# Patient Record
Sex: MALE | Race: WHITE
[De-identification: names, ages, dates, MRNs, and addresses within clinical notes are randomized per-mention and may not be internally consistent; named-entity substitution may affect disease eponyms.]

---

## 2019-09-14 ENCOUNTER — HOSPITAL ENCOUNTER (EMERGENCY)
Dept: HOSPITAL 9 - MADERS | Age: 59
LOS: 1 days | Discharge: LEFT BEFORE DISCHARGE | End: 2019-09-15
Payer: MEDICARE

## 2019-09-14 DIAGNOSIS — Z79.4: ICD-10-CM

## 2019-09-14 DIAGNOSIS — E11.9: ICD-10-CM

## 2019-09-14 DIAGNOSIS — I24.9: Primary | ICD-10-CM

## 2019-09-14 PROCEDURE — 93005 ELECTROCARDIOGRAM TRACING: CPT

## 2019-09-14 PROCEDURE — 71045 X-RAY EXAM CHEST 1 VIEW: CPT

## 2019-09-14 PROCEDURE — 96374 THER/PROPH/DIAG INJ IV PUSH: CPT

## 2019-09-14 PROCEDURE — 83880 ASSAY OF NATRIURETIC PEPTIDE: CPT

## 2019-09-14 PROCEDURE — 85025 COMPLETE CBC W/AUTO DIFF WBC: CPT

## 2019-09-14 PROCEDURE — 96372 THER/PROPH/DIAG INJ SC/IM: CPT

## 2019-09-14 PROCEDURE — 96375 TX/PRO/DX INJ NEW DRUG ADDON: CPT

## 2019-09-14 PROCEDURE — 80053 COMPREHEN METABOLIC PANEL: CPT

## 2019-09-14 PROCEDURE — 94760 N-INVAS EAR/PLS OXIMETRY 1: CPT

## 2019-09-14 PROCEDURE — 82550 ASSAY OF CK (CPK): CPT

## 2019-09-14 PROCEDURE — 84484 ASSAY OF TROPONIN QUANT: CPT

## 2019-09-15 ENCOUNTER — HOSPITAL ENCOUNTER (OUTPATIENT)
Dept: HOSPITAL 92 - ERS | Age: 59
Setting detail: OBSERVATION
Discharge: LEFT BEFORE DISCHARGE | End: 2019-09-15
Attending: INTERNAL MEDICINE | Admitting: INTERNAL MEDICINE
Payer: MEDICARE

## 2019-09-15 VITALS — SYSTOLIC BLOOD PRESSURE: 132 MMHG | TEMPERATURE: 97.5 F | DIASTOLIC BLOOD PRESSURE: 57 MMHG

## 2019-09-15 VITALS — BODY MASS INDEX: 33.7 KG/M2

## 2019-09-15 DIAGNOSIS — Z53.21: ICD-10-CM

## 2019-09-15 DIAGNOSIS — Z79.02: ICD-10-CM

## 2019-09-15 DIAGNOSIS — Z86.718: ICD-10-CM

## 2019-09-15 DIAGNOSIS — Z95.0: ICD-10-CM

## 2019-09-15 DIAGNOSIS — I25.10: ICD-10-CM

## 2019-09-15 DIAGNOSIS — Z95.5: ICD-10-CM

## 2019-09-15 DIAGNOSIS — Z79.899: ICD-10-CM

## 2019-09-15 DIAGNOSIS — Z88.5: ICD-10-CM

## 2019-09-15 DIAGNOSIS — R07.9: Primary | ICD-10-CM

## 2019-09-15 DIAGNOSIS — I50.9: ICD-10-CM

## 2019-09-15 DIAGNOSIS — E11.9: ICD-10-CM

## 2019-09-15 DIAGNOSIS — Z86.711: ICD-10-CM

## 2019-09-15 DIAGNOSIS — E78.5: ICD-10-CM

## 2019-09-15 DIAGNOSIS — Z88.0: ICD-10-CM

## 2019-09-15 DIAGNOSIS — Z79.01: ICD-10-CM

## 2019-09-15 DIAGNOSIS — Z79.84: ICD-10-CM

## 2019-09-15 DIAGNOSIS — Z88.8: ICD-10-CM

## 2019-09-15 DIAGNOSIS — I11.0: ICD-10-CM

## 2019-09-15 DIAGNOSIS — Z79.82: ICD-10-CM

## 2019-09-15 DIAGNOSIS — I25.2: ICD-10-CM

## 2019-09-15 DIAGNOSIS — Z88.6: ICD-10-CM

## 2019-09-15 DIAGNOSIS — Z95.1: ICD-10-CM

## 2019-09-15 LAB
ALBUMIN SERPL BCG-MCNC: 4 G/DL (ref 3.5–5)
ALBUMIN SERPL BCG-MCNC: 4.3 G/DL (ref 3.5–5)
ALP SERPL-CCNC: 69 U/L (ref 40–150)
ALP SERPL-CCNC: 72 U/L (ref 40–150)
ALT SERPL W P-5'-P-CCNC: 19 U/L (ref 8–55)
ALT SERPL W P-5'-P-CCNC: 20 U/L (ref 8–55)
ANION GAP SERPL CALC-SCNC: 15 MMOL/L (ref 10–20)
ANION GAP SERPL CALC-SCNC: 18 MMOL/L (ref 10–20)
AST SERPL-CCNC: 23 U/L (ref 5–34)
AST SERPL-CCNC: 30 U/L (ref 5–34)
BASOPHILS # BLD AUTO: 0 THOU/UL (ref 0–0.2)
BASOPHILS # BLD AUTO: 0.1 THOU/UL (ref 0–0.2)
BASOPHILS NFR BLD AUTO: 0.7 % (ref 0–1)
BASOPHILS NFR BLD AUTO: 1.5 % (ref 0–1)
BILIRUB SERPL-MCNC: 0.8 MG/DL (ref 0.2–1.2)
BILIRUB SERPL-MCNC: 0.9 MG/DL (ref 0.2–1.2)
BUN SERPL-MCNC: 16 MG/DL (ref 8.4–25.7)
BUN SERPL-MCNC: 16 MG/DL (ref 8.4–25.7)
CALCIUM SERPL-MCNC: 9.1 MG/DL (ref 7.8–10.44)
CALCIUM SERPL-MCNC: 9.3 MG/DL (ref 7.8–10.44)
CHLORIDE SERPL-SCNC: 105 MMOL/L (ref 98–107)
CHLORIDE SERPL-SCNC: 106 MMOL/L (ref 98–107)
CK SERPL-CCNC: 80 U/L (ref 30–200)
CO2 SERPL-SCNC: 23 MMOL/L (ref 22–29)
CO2 SERPL-SCNC: 24 MMOL/L (ref 22–29)
CREAT CL PREDICTED SERPL C-G-VRATE: 0 ML/MIN (ref 70–130)
CREAT CL PREDICTED SERPL C-G-VRATE: 0 ML/MIN (ref 70–130)
EOSINOPHIL # BLD AUTO: 0.2 THOU/UL (ref 0–0.7)
EOSINOPHIL # BLD AUTO: 0.2 THOU/UL (ref 0–0.7)
EOSINOPHIL NFR BLD AUTO: 2.8 % (ref 0–10)
EOSINOPHIL NFR BLD AUTO: 2.9 % (ref 0–10)
GLOBULIN SER CALC-MCNC: 3.1 G/DL (ref 2.4–3.5)
GLOBULIN SER CALC-MCNC: 3.3 G/DL (ref 2.4–3.5)
GLUCOSE SERPL-MCNC: 112 MG/DL (ref 70–105)
GLUCOSE SERPL-MCNC: 112 MG/DL (ref 70–105)
HGB BLD-MCNC: 10.4 G/DL (ref 14–18)
HGB BLD-MCNC: 10.9 G/DL (ref 14–18)
LYMPHOCYTES # BLD AUTO: 1.7 THOU/UL (ref 1.2–3.4)
LYMPHOCYTES # BLD: 1.9 THOU/UL (ref 1.2–3.4)
LYMPHOCYTES NFR BLD AUTO: 26.9 % (ref 21–51)
LYMPHOCYTES NFR BLD AUTO: 27.7 % (ref 21–51)
MCH RBC QN AUTO: 27.2 PG (ref 27–31)
MCH RBC QN AUTO: 28.3 PG (ref 27–31)
MCV RBC AUTO: 83 FL (ref 78–98)
MCV RBC AUTO: 85.3 FL (ref 78–98)
MONOCYTES # BLD AUTO: 0.4 THOU/UL (ref 0.11–0.59)
MONOCYTES # BLD AUTO: 0.4 THOU/UL (ref 0.11–0.59)
MONOCYTES NFR BLD AUTO: 6.2 % (ref 0–10)
MONOCYTES NFR BLD AUTO: 6.7 % (ref 0–10)
NEUTROPHILS # BLD AUTO: 3.8 THOU/UL (ref 1.4–6.5)
NEUTROPHILS # BLD AUTO: 4.2 THOU/UL (ref 1.4–6.5)
NEUTROPHILS NFR BLD AUTO: 62 % (ref 42–75)
NEUTROPHILS NFR BLD AUTO: 62.5 % (ref 42–75)
PLATELET # BLD AUTO: 155 THOU/UL (ref 130–400)
PLATELET # BLD AUTO: 172 THOU/UL (ref 130–400)
POTASSIUM SERPL-SCNC: 4.4 MMOL/L (ref 3.5–5.1)
POTASSIUM SERPL-SCNC: 4.7 MMOL/L (ref 3.5–5.1)
RBC # BLD AUTO: 3.83 MILL/UL (ref 4.7–6.1)
RBC # BLD AUTO: 3.86 MILL/UL (ref 4.7–6.1)
SODIUM SERPL-SCNC: 140 MMOL/L (ref 136–145)
SODIUM SERPL-SCNC: 142 MMOL/L (ref 136–145)
TROPONIN I SERPL DL<=0.01 NG/ML-MCNC: (no result) NG/ML (ref ?–0.03)
TROPONIN I SERPL DL<=0.01 NG/ML-MCNC: (no result) NG/ML (ref ?–0.03)
WBC # BLD AUTO: 6.2 THOU/UL (ref 4.8–10.8)
WBC # BLD AUTO: 6.7 THOU/UL (ref 4.8–10.8)

## 2019-09-15 PROCEDURE — 83880 ASSAY OF NATRIURETIC PEPTIDE: CPT

## 2019-09-15 PROCEDURE — 93010 ELECTROCARDIOGRAM REPORT: CPT

## 2019-09-15 PROCEDURE — 93005 ELECTROCARDIOGRAM TRACING: CPT

## 2019-09-15 PROCEDURE — 93306 TTE W/DOPPLER COMPLETE: CPT

## 2019-09-15 PROCEDURE — 96374 THER/PROPH/DIAG INJ IV PUSH: CPT

## 2019-09-15 PROCEDURE — 94760 N-INVAS EAR/PLS OXIMETRY 1: CPT

## 2019-09-15 PROCEDURE — 36415 COLL VENOUS BLD VENIPUNCTURE: CPT

## 2019-09-15 PROCEDURE — 36416 COLLJ CAPILLARY BLOOD SPEC: CPT

## 2019-09-15 PROCEDURE — 96375 TX/PRO/DX INJ NEW DRUG ADDON: CPT

## 2019-09-15 PROCEDURE — 96372 THER/PROPH/DIAG INJ SC/IM: CPT

## 2019-09-15 PROCEDURE — 96376 TX/PRO/DX INJ SAME DRUG ADON: CPT

## 2019-09-15 PROCEDURE — G0378 HOSPITAL OBSERVATION PER HR: HCPCS

## 2019-09-15 NOTE — HP
CHIEF COMPLAINT:  Chest pain.



HISTORY OF PRESENT ILLNESS:  Mr. Urena is a 59-year-old male with significant

cardiac history, presents to [facility] ED with chief complaint of sudden onset of

chest pain that began while the patient was driving a long road trip last night.

The patient went to [facility] ED, with chest pain, diaphoresis, nausea and left

upper extremity paresthesia, left jaw paresthesia.  The patient was evaluated.  The

physician recommended his admission.  The patient agreed to admission, but choose to

come to our ER instead of using the ambulance transport.  The patient reports

syncopal episodes 11 weeks ago ? cardiac arrest.  He stated that they have to

perform CPR and cardioversion back in [place].  After the event, pacemaker was

placed.  He has a history of 5-vessel coronary artery bypass graft surgery in 2012,

all of them closed.  He had 3 cardiac stents placed in 2013.  He states that he

takes Eliquis, Brilinta, aspirin.  In [facility] ED, the patient was given one

dose of subcutaneous Lovenox.  Currently, the patient is still complaining of chest

pain, but to a lesser degree.  The patient is going to be admitted to the hospital

for further management. 



PAST MEDICAL HISTORY:  

1. Myocardial infarction.

2. Cardiac arrest ?

3. Heart failure.

4. Hyperlipidemia.

5. Diabetes type 2.

6. Hypertension.



PAST SURGICAL HISTORY:  

1. Appendectomy.

2. Tonsillectomy.

3. Coronary artery bypass graft surgery.

4. Five stents.

5. Cardiac pacemaker.



SOCIAL HISTORY:  Denies smoking, alcohol drinking, or drug abuse.



FAMILY HISTORY:  Reviewed and noncontributory.



ALLERGIES:  ALLERGIC TO FENTANYL, HYDROCODONE, LORTAB, PENICILLIN, TORADOL.



HOME MEDICATIONS:  Please see home medication reconciliation form for updated

medications. 



REVIEW OF SYSTEMS:  Review of 14 systems is negative except what is mentioned in the

history of present illness. 



PHYSICAL EXAMINATION:

GENERAL:  The patient is awake, alert, in mild distress. 

VITAL SIGNS:  Blood pressure 138/73, pulse is 63, respiratory rate 17, temperature

98. 

HEAD AND NECK:  Normocephalic, atraumatic.  Neck is supple.  No JVD. 

CHEST:  Fair bilateral air entry.  Scar from previous sternotomy. 

HEART:  S1, S2.  Regular. 

ABDOMEN:  Obese, soft.  Bowel sounds present. 

NEUROLOGIC:  Awake, alert, oriented x3. 

PSYCH:  Normal mood. 

EXTREMITIES:  No clubbing or cyanosis.



LABORATORY DATA:  Sodium 140, potassium 4.4, BUN is 16, creatinine 1.3, glucose 112.

 WBC 6.7, hemoglobin 10.9.  Troponin less than 0.01.  BNP 47.  EKG showed atrial

paced rhythm, incomplete right bundle-branch block. 



ASSESSMENT AND PLAN:  Mr. Urena is a 59-year-old male with significant cardiac

history, presenting to the emergency room with chest pain. 

1. Acute chest pain, rule out acute coronary syndrome.

2. Coronary artery disease with history of coronary artery bypass graft surgery,

multiple stents. 

3. Hypertension.

4. Hyperlipidemia.

5. History of cardiac arrest.

6. Diabetes.



PLAN:  

1. Admit.

2. Aspirin.

3. We will keep the patient n.p.o. for now until the patient is reassessed in a.m.

4. Serial cardiac enzymes.

5. May need to consult Cardiology in a.m. if chest pain persists or if his troponin

increases given his significant cardiac history. 

6. Continue home anticoagulation.  As per the patient, he is on Eliquis.  Also, he

was given one dose of Lovenox in the ED. 

7. Reconcile home medications.

8. DVT prophylaxis.  Continue home anticoagulant.

9. Expected length of stay, at least one midnight if patient is stable.







Job ID:  [id number]

## 2019-09-15 NOTE — PRG
DATE OF SERVICE:  09/15/2019



After Mr. Urena had had negative CKs, troponins, in addition to normal appearing

echo with no significant EKG changes, I recommended a stress Cardiolite study.  We

would inject him with sestamibi and assess for ischemia. 



Melody, the nuclear technologist, came up to inject Mr. Urena.  Mr. Urena

refused.  He stated that I had personally told him this was not his heart.  I walked

into the room and tried to re-emphasize that so far this did not appear to be

cardiac.  The workup was still in progress.  I recommended continued troponin levels

and we will defer proceeding with angio if needed for several days given his dose of

Lovenox this morning in addition to continued Eliquis use.  The patient states he

would like to go back to [place].  I did state he would have to leave [facility] given it

was not my recommendation and he stay for continued workup.  The patient refused and

decided to leave the hospital.  The patient did appear comfortable.  I did state he

should not drive given that he has received morphine.  No further recommendations. 







Job ID:  [id number]

## 2019-09-15 NOTE — RAD
CHEST 1 VIEW:

 

HISTORY: 

Pain.

 

COMPARISON: 

None.

 

FINDINGS: 

There are sternotomy wires.  Left-sided transvenous pacemaker with lead position in the right atrium 
and right ventricle.  Magnification of the cardiac silhouette due to technique.  The pulmonary vessel
s and hilum are normal.  Costophrenic angles are clear.  No masses or consolidation.  No pneumothorax
 or osseous abnormalities.

 

IMPRESSION: 

No acute cardiopulmonary process.

 

POS: OFF

## 2019-09-15 NOTE — CON
DATE OF CONSULTATION:  



REASON FOR CONSULTATION:  Chest pain.



HISTORY OF PRESENT ILLNESS:  Mr. Urena is a 59-year-old gentleman who was passing

through.  He was driving from [place] to [place] when he developed acute onset

chest pain.  He was seen and evaluated in [place].  He was told he needed to be

sent to [facility].  His own vehicle proceeded to [place]. 



He states his pain is 8/10.  He states his pain has been noted since midnight and

has been unremitting.  His CKs troponins have been negative x3.  His EKG shows

atrial pacing with no ST-T wave changes suggesting ischemia.  His echo shows a

normal LVEF. 

Mr. Urena states he had bypass surgery in 2012.  He then underwent coronary

angiography in 2013 where 3 of 5 grafts were occluded.  He underwent stent placement

x3. 



He then states in May of this year, he had a cardiac arrest.  He was intubated for

5-7 days.  It is unknown whether he underwent coronary angiography.  He states no

further stents were placed. 



PAST MEDICAL HISTORY:  As above including diabetes mellitus, hyperlipidemia,

hypertension, DVT with PE, appendectomy, tonsillectomy, ICD placement. 



FAMILY HISTORY:  Negative for CAD.



SOCIAL HISTORY:  Negative.  No current tobacco or alcohol use.



ALLERGIES:  FENTANYL, HYDROCODONE, LORTAB, PENICILLIN.



REVIEW OF SYSTEMS:  A 10-point review of systems is reviewed as above, otherwise

negative. 



PHYSICAL EXAMINATION:

GENERAL:  He is complaining of 8/10 pain, but appears comfortable. 

VITAL SIGNS:  Blood pressure 128/66, pulse 60, temperature 97.2. 

NEUROLOGIC:  The patient is alert and oriented x3 with no focal neurologic deficits. 

HEENT:  Sclerae without icterus.  Mouth has moist mucous membranes with normal

pallor. 

NECK:  No JVD.  Carotid upstroke brisk.  No bruits bilaterally. 

LUNGS:  Clear to auscultation with unlabored respirations. 

BACK:  No scoliosis or kyphosis. 

CARDIAC:  Regular rate and rhythm with normal S1 and S2.  No S3 or S4 noted.  No

significant rubs, murmurs, thrills, or gallops noted throughout the precordium.  PMI

is not displaced.  There is no parasternal heave. 

ABDOMEN:  Soft, nontender, nondistended.  No peritoneal signs present.  No

hepatosplenomegaly.  No abnormal striae. 

EXTREMITIES:  2+ femoral and 2+ dorsalis pedis pulses.  No cyanosis, clubbing, or

edema. 

SKIN:  No gross abnormalities.



PERTINENT LABORATORY DATA:  Hemoglobin 10.9, creatinine 1.36, GFR 54.  EKG as above.



IMPRESSION:  

1. Chest pain.

2. Coronary artery disease.

3. Status post stent placement.

4. Status post bypass surgery.

5. Status post previous deep venous thrombosis with pulmonary embolism.

6. Previous cardiac arrest.



RECOMMENDATIONS:  Certainly difficult case of Mr. Urena.  Mr. Urena continues to

be on Eliquis, Brilinta in addition to aspirin.  He received an additional dose of

Lovenox this morning.  He appears to be fairly anticoagulated.  Given that his

enzymes have been negative, EKG negative, echo without wall motion abnormalities, we

would recommend a more conservative approach.  He is at increased risk of bleeding

complications given the amount of anticoagulants and antiplatelets he has received.

He does appear comfortable.  We will proceed with a stress Cardiolite study given

that he has continued to have pain to assess for any new areas of ischemia.  We 

will try and obtain hospital records.  The patient's current status will be dictated

by his current course.  If his enzymes are elevated or any further changes that

suggest 

ischemia, we would then proceed with angio, but at this point, the benefits do not

outweigh the risks in Mr. Urena. 







Job ID:  [id number]

## 2019-09-16 NOTE — DIS
DATE OF ADMISSION:  09/15/2019



DATE OF DISCHARGE:  09/15/2019



DISCHARGE DIAGNOSES:  

1. Chest pain.

2. Presumed unstable angina.

3. Coronary artery disease.

4. Status post cardiac arrest.

5. Diabetes mellitus.

6. Hyperlipidemia.

7. Hypertension.

8. Prior deep venous thrombosis with pulmonary embolism.



HOSPITAL COURSE:  The patient was admitted with chest pain.  While evaluation was

still in progress, the patient left against medical advice. 







Job ID:  [id number]

## 2019-09-17 NOTE — EKG
Test Reason : 

Blood Pressure : ***/*** mmHG

Vent. Rate : 060 BPM     Atrial Rate : 060 BPM

   P-R Int : 156 ms          QRS Dur : 100 ms

    QT Int : 416 ms       P-R-T Axes : 000 057 046 degrees

   QTc Int : 416 ms

 

Electronic atrial pacemaker

When compared with ECG of 15-SEP-2019 02:17, (Unconfirmed)

No significant change was found

Confirmed by DR. MARCOS LOPEZ ([id number]) on 9/17/2019 7:22:01 AM

 

Referred By:  SATISH           Confirmed By:DR. MARCOS LOPEZ

## 2019-09-21 NOTE — EKG
Test Reason : 

Blood Pressure : ***/*** mmHG

Vent. Rate : 060 BPM     Atrial Rate : 060 BPM

   P-R Int : 148 ms          QRS Dur : 096 ms

    QT Int : 406 ms       P-R-T Axes : 110 046 057 degrees

   QTc Int : 406 ms

 

Atrial-paced rhythm

Incomplete right bundle branch block

Abnormal ECG

 

Confirmed by JUAN JESSICA D.O. ([id number]),  SHALINI SHARP ([id number]) on 9/21/2019 12:11:09 PM

 

Referred By:             Confirmed By:JUAN JESSICA D.O.